# Patient Record
Sex: FEMALE | Race: BLACK OR AFRICAN AMERICAN | NOT HISPANIC OR LATINO | URBAN - METROPOLITAN AREA
[De-identification: names, ages, dates, MRNs, and addresses within clinical notes are randomized per-mention and may not be internally consistent; named-entity substitution may affect disease eponyms.]

---

## 2019-10-15 ENCOUNTER — EMERGENCY (EMERGENCY)
Facility: HOSPITAL | Age: 24
LOS: 1 days | Discharge: HOME | End: 2019-10-15
Attending: EMERGENCY MEDICINE
Payer: SELF-PAY

## 2019-10-15 VITALS
SYSTOLIC BLOOD PRESSURE: 118 MMHG | RESPIRATION RATE: 18 BRPM | TEMPERATURE: 98 F | DIASTOLIC BLOOD PRESSURE: 70 MMHG | WEIGHT: 126.99 LBS | OXYGEN SATURATION: 100 % | HEIGHT: 64 IN | HEART RATE: 80 BPM

## 2019-10-15 VITALS
SYSTOLIC BLOOD PRESSURE: 129 MMHG | TEMPERATURE: 99 F | DIASTOLIC BLOOD PRESSURE: 80 MMHG | OXYGEN SATURATION: 100 % | RESPIRATION RATE: 16 BRPM | HEART RATE: 74 BPM

## 2019-10-15 PROCEDURE — 99053 MED SERV 10PM-8AM 24 HR FAC: CPT

## 2019-10-15 PROCEDURE — 99284 EMERGENCY DEPT VISIT MOD MDM: CPT

## 2019-10-15 PROCEDURE — 99283 EMERGENCY DEPT VISIT LOW MDM: CPT

## 2019-10-15 RX ORDER — METOCLOPRAMIDE HCL 10 MG
10 TABLET ORAL ONCE
Refills: 0 | Status: DISCONTINUED | OUTPATIENT
Start: 2019-10-15 | End: 2019-10-15

## 2019-10-15 RX ORDER — SODIUM CHLORIDE 9 MG/ML
1000 INJECTION INTRAMUSCULAR; INTRAVENOUS; SUBCUTANEOUS ONCE
Refills: 0 | Status: DISCONTINUED | OUTPATIENT
Start: 2019-10-15 | End: 2019-10-15

## 2019-10-15 RX ORDER — KETOROLAC TROMETHAMINE 30 MG/ML
15 SYRINGE (ML) INJECTION ONCE
Refills: 0 | Status: DISCONTINUED | OUTPATIENT
Start: 2019-10-15 | End: 2019-10-15

## 2019-10-15 RX ORDER — ACETAMINOPHEN 500 MG
650 TABLET ORAL ONCE
Refills: 0 | Status: COMPLETED | OUTPATIENT
Start: 2019-10-15 | End: 2019-10-15

## 2019-10-15 RX ORDER — IBUPROFEN 200 MG
600 TABLET ORAL ONCE
Refills: 0 | Status: COMPLETED | OUTPATIENT
Start: 2019-10-15 | End: 2019-10-15

## 2019-10-15 RX ADMIN — Medication 650 MILLIGRAM(S): at 03:11

## 2019-10-15 RX ADMIN — Medication 600 MILLIGRAM(S): at 03:11
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
## 2019-10-15 NOTE — ED ADULT NURSE NOTE - CHPI ED NUR SYMPTOMS NEG
no back pain/no decreased eating/drinking/no headache/no laceration/no disorientation/no dizziness/no loss of consciousness/no crying/no acting out behaviors/no fussiness
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
## 2019-10-15 NOTE — ED ADULT NURSE NOTE - ED NEURO NIH ASSESS
Nursing Note by Darvin French CMA at 05/01/18 02:17 PM     Author:  Darvin French CMA Service:  (none) Author Type:  Certified Medical Assistant     Filed:  05/01/18 02:18 PM Encounter Date:  5/1/2018 Status:  Signed     :  Darvin French CMA (Certified Medical Assistant)            If the provider orders any diagnostic testing at today's visit, the patient would prefer to be contacted by means of[FA1.1T] HOME[FA1.1M].  If by phone their preferred phone number is[FA1.1T] [phone][FA1.1M] and it is[FA1.1T] OK[FA1.1M] to leave a detailed message on their voicemail or with a family member.  Patient is aware that if they are my chart active most of their test results will be auto released once the provider has viewed them.[FA1.1T]        Revision History        User Key Date/Time User Provider Type Action    > FA1.1 05/01/18 02:18 PM Darvin French CMA Certified Medical Assistant Sign    M - Manual, T - Template             within defined limits
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
## 2019-10-15 NOTE — ED PROVIDER NOTE - PHYSICAL EXAMINATION
General appearance: NAD, conversant, afebrile    Eyes: anicteric sclerae, DEJON, EOMI   HENT: Atraumatic; oropharynx clear, MMM and no ulcerations, no pharyngeal erythema or exudate, c collar placed   Neck: Trachea midline; Full range of motion, supple no midline tenderness   Pulm: CTA bl, normal respiratory effort and no intercostal retractions, normal work of breathing   CV: RRR, No murmurs, rubs, or gallops   Abdomen: Soft, non-tender, non-distended; no masses or hepatosplenomegaly.   Back: No thoracic/ lumbar midline tenderness   Extremities: No peripheral edema or extremity lymphadenopathy. 5/5 strength in all four extremities.   Skin: Dry, normal temperature, turgor and texture; no rash   Psych: Appropriate affect, cooperative; alert and oriented to person, place and time

## 2019-10-15 NOTE — ED PROVIDER NOTE - PATIENT PORTAL LINK FT
You can access the FollowMyHealth Patient Portal offered by [facility] by registering at the following website: [email or web address]. By joining [facility]’s FollowMyHealth portal, you will also be able to view your health information using other applications (apps) compatible with our system.
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
## 2019-10-15 NOTE — ED PROVIDER NOTE - NSFOLLOWUPINSTRUCTIONS_ED_ALL_ED_FT
Rest, drink plenty of fluids  Advance activity as tolerated  Continue all previously prescribed medications as directed  Follow up with your PMD 2-3 days  Take tylenol 650mg every 6 hours or ibuprofen 600mg every 6 hours as needed for pain  Return to the ER for worsening headaches, vomiting, loss of consciousness, neurologic changes, or other new or concerning symptoms
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
## 2020-03-13 NOTE — ED ADULT NURSE NOTE - CARDIO WDL
March 15, 2020      José Miguel Frances IV, MD  [address]  [address]  [place] [address]           [facility]  [address], [address]  [facility] [address]  Phone: [phone]  Fax: [phone]          Patient: Jenny Julien   MR Number: [id number]   YOB: 1979   Date of Visit: 3/13/2020       Dear Dr. José Miguel Frances IV:    Thank you for referring Jenny Julien to me for evaluation. Attached you will find relevant portions of my assessment and plan of care.    If you have questions, please do not hesitate to call me. I look forward to following Jenny Julien along with you.    Sincerely,    ALBERT Cruz    Enclosure  CC:  No Recipients    If you would like to receive this communication electronically, please contact [email or web address] or [phone] to request more information on [employer] access.    For providers and/or their staff who would like to refer a patient to [facility], please contact us through our one-stop-shop provider referral line, [facility], at [phone].    If you feel you have received this communication in error or would no longer like to receive these types of communications, please e-mail [email or web address]          Normal rate, regular rhythm